# Patient Record
Sex: FEMALE | Race: WHITE | URBAN - METROPOLITAN AREA
[De-identification: names, ages, dates, MRNs, and addresses within clinical notes are randomized per-mention and may not be internally consistent; named-entity substitution may affect disease eponyms.]

---

## 2020-03-04 ENCOUNTER — APPOINTMENT (RX ONLY)
Dept: URBAN - METROPOLITAN AREA CLINIC 11 | Facility: CLINIC | Age: 55
Setting detail: DERMATOLOGY
End: 2020-03-04

## 2020-03-04 DIAGNOSIS — Z41.1 ENCOUNTER FOR COSMETIC SURGERY: ICD-10-CM

## 2020-03-04 DIAGNOSIS — Z41.9 ENCOUNTER FOR PROCEDURE FOR PURPOSES OTHER THAN REMEDYING HEALTH STATE, UNSPECIFIED: ICD-10-CM

## 2020-03-04 PROCEDURE — Z1024: HCPCS

## 2020-03-04 PROCEDURE — ? FILLERS

## 2020-03-04 PROCEDURE — ? BOTOX

## 2020-03-04 PROCEDURE — ? CONSULTATION - AESTHETIC FACIAL DEFORMITY

## 2020-03-04 PROCEDURE — 99003: CPT

## 2020-03-04 PROCEDURE — Z1031: HCPCS

## 2020-03-04 ASSESSMENT — LOCATION DETAILED DESCRIPTION DERM
LOCATION DETAILED: RIGHT INFERIOR LATERAL BUCCAL CHEEK
LOCATION DETAILED: RIGHT LATERAL EYEBROW
LOCATION DETAILED: RIGHT UPPER CUTANEOUS LIP
LOCATION DETAILED: LEFT INFERIOR LATERAL BUCCAL CHEEK
LOCATION DETAILED: LEFT INFERIOR CENTRAL MALAR CHEEK
LOCATION DETAILED: INFERIOR MID FOREHEAD
LOCATION DETAILED: LEFT UPPER CUTANEOUS LIP
LOCATION DETAILED: GLABELLA
LOCATION DETAILED: LEFT LATERAL EYEBROW

## 2020-03-04 ASSESSMENT — LOCATION SIMPLE DESCRIPTION DERM
LOCATION SIMPLE: LEFT CHEEK
LOCATION SIMPLE: INFERIOR FOREHEAD
LOCATION SIMPLE: LEFT LIP
LOCATION SIMPLE: LEFT EYEBROW
LOCATION SIMPLE: RIGHT CHEEK
LOCATION SIMPLE: RIGHT LIP
LOCATION SIMPLE: GLABELLA
LOCATION SIMPLE: RIGHT EYEBROW

## 2020-03-04 ASSESSMENT — LOCATION ZONE DERM
LOCATION ZONE: FACE
LOCATION ZONE: LIP

## 2020-03-04 NOTE — PROCEDURE: CONSULTATION - AESTHETIC FACIAL DEFORMITY
Send Procedure Quote As Charge: No
Detail Level: Zone
Consultation Charge $ (Use Numbers Only, No Text Please.): 50.00

## 2020-03-04 NOTE — PROCEDURE: FILLERS
Lateral Canthal Filler Volume In Cc: 0
Use Map Statement For Sites (Optional): No
Filler: Tomer Fabian (Perlane-L)
Filler: Juvederm Voluma XC
Administered By (Optional): Dr. Abrahan Rodriguez
Topical Anesthetic #3: phenenylephrine
Map Statment: See attached map for complete details
Expiration Date (Month Year): 02/2022
Lot #: [id number]
Price $ (Numeric Only, No Text Please.): 600
Topical Anesthetic #1: lidocaine
Consent: Verbal and written consent obtained. Risks include but not limited to bruising, beading, irregular texture, ulceration, infection, allergic reaction, scar formation, incomplete augmentation, temporary nature, procedural pain.
Topical Anesthesia?: yes
Jawline Filler Volume In Cc: 1
Topical Anesthetic #2: prilocaine
Topical Anesthetic Base: cream
Additional Area 1 Location: lips
Additional Area 1 Location: pyriform
Expiration Date (Month Year): 04/2020
Lot #: [id number]
Price $ (Numeric Only, No Text Please.): 797
Detail Level: Simple

## 2020-03-04 NOTE — PROCEDURE: BOTOX
Glabellar Complex Units: 15
Administered By (Optional): Dr. Abrahan Rodriguez
Summary Price $ (Use Numbers Only, No Text Please.): 280
Map Statment: See attached map for complete details
Forehead Units: 5
Additional Area 1 Location: r lat brow
Additional Area 2 Units: 2.5
Price Per Unit In $ (Use Numbers Only, No Text Please.): 14
Use Map Statement For Sites (Optional): No
Lot #: [id number]
John Units: 0
Show Price In Note?: yes
Consent: Verbal and written consent was obtained prior to the procedure. Risks, benefits, expectations and alternatives were discussed including, but not limited to, infection, bleeding, lid/brow ptosis, bruising, swelling, diplopia, temporary effects, incomplete chemical denervation and dissatisfaction with the cosmetic outcome. No guarantee or warranty was given or implied regarding longevity of results.
Postcare Instructions: Patient instructed to not lie down for 4 hours and limit physical activity for 24 hours. Patient instructed not to travel by airplane for 48 hours.\\n\\n
Reconstitution Date: 03/04/2020
Detail Level: Simple
Additional Area 2 Location: l lat brow
Bill Summary Price Listed Below, Or Bill Total Of Units X Price Per Unit?: Bill Summary Price Below
Expiration Date (Month Year): 08/2022

## 2020-06-11 ENCOUNTER — APPOINTMENT (RX ONLY)
Dept: URBAN - METROPOLITAN AREA CLINIC 11 | Facility: CLINIC | Age: 55
Setting detail: DERMATOLOGY
End: 2020-06-11

## 2020-06-11 DIAGNOSIS — Z41.9 ENCOUNTER FOR PROCEDURE FOR PURPOSES OTHER THAN REMEDYING HEALTH STATE, UNSPECIFIED: ICD-10-CM

## 2020-06-11 PROCEDURE — Z1094: HCPCS

## 2020-06-11 PROCEDURE — Z1024: HCPCS

## 2020-06-11 PROCEDURE — ? FILLERS

## 2020-06-11 PROCEDURE — ? BOTOX

## 2020-06-11 ASSESSMENT — LOCATION SIMPLE DESCRIPTION DERM
LOCATION SIMPLE: SUPERIOR FOREHEAD
LOCATION SIMPLE: RIGHT CHEEK
LOCATION SIMPLE: LEFT LIP
LOCATION SIMPLE: LEFT EYEBROW
LOCATION SIMPLE: LEFT CHEEK
LOCATION SIMPLE: RIGHT LIP
LOCATION SIMPLE: GLABELLA
LOCATION SIMPLE: RIGHT EYEBROW

## 2020-06-11 ASSESSMENT — LOCATION DETAILED DESCRIPTION DERM
LOCATION DETAILED: LEFT LATERAL EYEBROW
LOCATION DETAILED: GLABELLA
LOCATION DETAILED: LEFT NASOLABIAL FOLD
LOCATION DETAILED: RIGHT LATERAL EYEBROW
LOCATION DETAILED: RIGHT NASOLABIAL FOLD
LOCATION DETAILED: RIGHT MEDIAL BUCCAL CHEEK
LOCATION DETAILED: LEFT MEDIAL BUCCAL CHEEK
LOCATION DETAILED: SUPERIOR MID FOREHEAD

## 2020-06-11 ASSESSMENT — LOCATION ZONE DERM
LOCATION ZONE: LIP
LOCATION ZONE: FACE

## 2020-06-11 NOTE — PROCEDURE: BOTOX
Administered By (Optional): Dr. Abrahan Rodriguez
Detail Level: Simple
John Units: 0
Summary Price $ (Use Numbers Only, No Text Please.): 455
Additional Area 1 Location: r lat brow
Additional Area 2 Location: l lat brow
Expiration Date (Month Year): 01/2023
Use Map Statement For Sites (Optional): No
Dilution (U/0.1 Cc): 5
Map Statment: See attached map for complete details
Consent: Verbal and written consent was obtained prior to the procedure. Risks, benefits, expectations and alternatives were discussed including, but not limited to, infection, bleeding, lid/brow ptosis, bruising, swelling, diplopia, temporary effects, incomplete chemical denervation and dissatisfaction with the cosmetic outcome. No guarantee or warranty was given or implied regarding longevity of results.
Additional Area 1 Units: 2.5
Price Per Unit In $ (Use Numbers Only, No Text Please.): 13
Bill Summary Price Listed Below, Or Bill Total Of Units X Price Per Unit?: Bill Summary Price Below
Lot #: [id number]
Reconstitution Date: 06/11/2020
Forehead Units: 10
Glabellar Complex Units: 20
Postcare Instructions: Patient instructed to not lie down for 4 hours and limit physical activity for 24 hours. Patient instructed not to travel by airplane for 48 hours.\\n\\n
Show Price In Note?: yes

## 2020-06-11 NOTE — PROCEDURE: FILLERS
Mental Crease Filler Volume In Cc: 0
Use Map Statement For Sites (Optional): No
Filler: Restylane Defyne
Additional Area 1 Location: lips
Topical Anesthetic #3: phenenylephrine
Marionette Lines Filler Volume In Cc: 1
Map Statment: See attached map for complete details
Topical Anesthetic Base: cream
Topical Anesthesia?: yes
Detail Level: Simple
Lot #: [id number]
Topical Anesthetic #1: lidocaine
Administered By (Optional): Dr. Abrahan Rodriguez
Topical Anesthetic #2: prilocaine
Expiration Date (Month Year): 01/31/2021
Injected Anesthesia In Cc: 0.6
Consent: Verbal and written consent obtained. Risks include but not limited to bruising, beading, irregular texture, ulceration, infection, allergic reaction, scar formation, incomplete augmentation, temporary nature, procedural pain.

## 2021-03-31 ENCOUNTER — APPOINTMENT (RX ONLY)
Dept: URBAN - METROPOLITAN AREA CLINIC 11 | Facility: CLINIC | Age: 56
Setting detail: DERMATOLOGY
End: 2021-03-31

## 2021-03-31 DIAGNOSIS — Z41.1 ENCOUNTER FOR COSMETIC SURGERY: ICD-10-CM

## 2021-03-31 DIAGNOSIS — Z41.9 ENCOUNTER FOR PROCEDURE FOR PURPOSES OTHER THAN REMEDYING HEALTH STATE, UNSPECIFIED: ICD-10-CM

## 2021-03-31 PROCEDURE — Z1093: HCPCS

## 2021-03-31 PROCEDURE — ? FILLERS

## 2021-03-31 PROCEDURE — ? CONSULTATION - AESTHETIC FACIAL DEFORMITY

## 2021-03-31 PROCEDURE — 99005: CPT

## 2021-03-31 PROCEDURE — Z1094: HCPCS

## 2021-03-31 ASSESSMENT — LOCATION ZONE DERM
LOCATION ZONE: FACE
LOCATION ZONE: LIP
LOCATION ZONE: NECK

## 2021-03-31 ASSESSMENT — LOCATION DETAILED DESCRIPTION DERM
LOCATION DETAILED: LEFT CENTRAL BUCCAL CHEEK
LOCATION DETAILED: RIGHT INFERIOR CENTRAL MALAR CHEEK
LOCATION DETAILED: RIGHT LATERAL BUCCAL CHEEK
LOCATION DETAILED: LEFT NASOLABIAL FOLD
LOCATION DETAILED: RIGHT NASOLABIAL FOLD
LOCATION DETAILED: RIGHT INFERIOR LATERAL NECK

## 2021-03-31 ASSESSMENT — LOCATION SIMPLE DESCRIPTION DERM
LOCATION SIMPLE: LEFT LIP
LOCATION SIMPLE: RIGHT LIP
LOCATION SIMPLE: NECK
LOCATION SIMPLE: RIGHT CHEEK
LOCATION SIMPLE: LEFT CHEEK

## 2021-03-31 NOTE — PROCEDURE: FILLERS
Additional Area 5 Volume In Cc: 0
Nasolabial Folds Filler Volume In Cc: 0.6
Topical Anesthetic Time (In Min): 3
Lot #: [id number]
Expiration Date (Month Year): 2022/08/31
Topical Anesthetic #2: prilocaine
Administered By (Optional): Dr. Abrahan Rodriguez
Price $ (Numeric Only, No Text Please.): 138
Filler: Tomer Fabian (Perlane-L)
Show Price In Note?: yes
Filler: Restylane Defyne
Marionette Lines Filler Volume In Cc: 0.4
Additional Area 1 Location: chin
Topical Anesthetic #3: phenenylephrine
Use Map Statement For Sites (Optional): No
Expiration Date (Month Year): 2023/08/31
Additional Area 1 Volume In Cc: 1
Price $ (Numeric Only, No Text Please.): 438
Consent: Verbal and written consent obtained. Risks include but not limited to bruising, beading, irregular texture, ulceration, infection, allergic reaction, scar formation, incomplete augmentation, temporary nature, procedural pain.
Lot #: [id number]
Topical Anesthetic Base: cream
Map Statment: See attached map for complete details
Topical Anesthetic #1: lidocaine
Detail Level: Simple